# Patient Record
Sex: FEMALE | Race: WHITE | NOT HISPANIC OR LATINO | URBAN - METROPOLITAN AREA
[De-identification: names, ages, dates, MRNs, and addresses within clinical notes are randomized per-mention and may not be internally consistent; named-entity substitution may affect disease eponyms.]

---

## 2020-07-22 ENCOUNTER — HISTORICAL (OUTPATIENT)
Dept: URGENT CARE | Facility: CLINIC | Age: 65
End: 2020-07-22

## 2020-07-22 LAB
BILIRUB SERPL-MCNC: NEGATIVE MG/DL
BLOOD URINE, POC: NEGATIVE
CLARITY, POC UA: NORMAL
COLOR, POC UA: NORMAL
GLUCOSE UR QL STRIP: NEGATIVE
KETONES UR QL STRIP: NEGATIVE
LEUKOCYTE EST, POC UA: NEGATIVE
NITRITE, POC UA: NEGATIVE
PH, POC UA: 5
PROTEIN, POC: NORMAL
SPECIFIC GRAVITY, POC UA: 1.02
UROBILINOGEN, POC UA: NORMAL

## 2020-07-24 LAB — FINAL CULTURE: NO GROWTH

## 2022-09-17 ENCOUNTER — HISTORICAL (OUTPATIENT)
Dept: ADMINISTRATIVE | Facility: HOSPITAL | Age: 67
End: 2022-09-17

## 2023-06-18 ENCOUNTER — OFFICE VISIT (OUTPATIENT)
Dept: URGENT CARE | Facility: CLINIC | Age: 68
End: 2023-06-18
Payer: MEDICARE

## 2023-06-18 VITALS
HEART RATE: 61 BPM | HEIGHT: 65 IN | DIASTOLIC BLOOD PRESSURE: 74 MMHG | OXYGEN SATURATION: 97 % | SYSTOLIC BLOOD PRESSURE: 156 MMHG | WEIGHT: 155 LBS | TEMPERATURE: 98 F | BODY MASS INDEX: 25.83 KG/M2 | RESPIRATION RATE: 18 BRPM

## 2023-06-18 DIAGNOSIS — J32.9 SINUSITIS, UNSPECIFIED CHRONICITY, UNSPECIFIED LOCATION: ICD-10-CM

## 2023-06-18 DIAGNOSIS — R05.9 COUGH, UNSPECIFIED TYPE: Primary | ICD-10-CM

## 2023-06-18 PROCEDURE — 96372 THER/PROPH/DIAG INJ SC/IM: CPT | Mod: ,,, | Performed by: FAMILY MEDICINE

## 2023-06-18 PROCEDURE — 99203 OFFICE O/P NEW LOW 30 MIN: CPT | Mod: 25,,, | Performed by: FAMILY MEDICINE

## 2023-06-18 PROCEDURE — 96372 PR INJECTION,THERAP/PROPH/DIAG2ST, IM OR SUBCUT: ICD-10-PCS | Mod: ,,, | Performed by: FAMILY MEDICINE

## 2023-06-18 PROCEDURE — 99203 PR OFFICE/OUTPT VISIT, NEW, LEVL III, 30-44 MIN: ICD-10-PCS | Mod: 25,,, | Performed by: FAMILY MEDICINE

## 2023-06-18 RX ORDER — LEVOTHYROXINE SODIUM 100 UG/1
100 TABLET ORAL
COMMUNITY

## 2023-06-18 RX ORDER — PROMETHAZINE HYDROCHLORIDE AND DEXTROMETHORPHAN HYDROBROMIDE 6.25; 15 MG/5ML; MG/5ML
5 SYRUP ORAL EVERY 6 HOURS PRN
Qty: 120 ML | Refills: 0 | Status: SHIPPED | OUTPATIENT
Start: 2023-06-18 | End: 2023-06-24

## 2023-06-18 RX ORDER — DEXAMETHASONE SODIUM PHOSPHATE 100 MG/10ML
8 INJECTION INTRAMUSCULAR; INTRAVENOUS
Status: COMPLETED | OUTPATIENT
Start: 2023-06-18 | End: 2023-06-18

## 2023-06-18 RX ORDER — METOPROLOL SUCCINATE 25 MG/1
25 TABLET, EXTENDED RELEASE ORAL
COMMUNITY
Start: 2023-05-02

## 2023-06-18 RX ORDER — SIMVASTATIN 20 MG/1
20 TABLET, FILM COATED ORAL NIGHTLY
COMMUNITY
Start: 2023-03-21

## 2023-06-18 RX ORDER — PREDNISONE 10 MG/1
30 TABLET ORAL DAILY
Qty: 9 TABLET | Refills: 0 | Status: SHIPPED | OUTPATIENT
Start: 2023-06-18 | End: 2023-06-21

## 2023-06-18 RX ORDER — DOXYCYCLINE 100 MG/1
100 CAPSULE ORAL 2 TIMES DAILY
Qty: 14 CAPSULE | Refills: 0 | Status: SHIPPED | OUTPATIENT
Start: 2023-06-18 | End: 2023-06-25

## 2023-06-18 RX ADMIN — DEXAMETHASONE SODIUM PHOSPHATE 8 MG: 100 INJECTION INTRAMUSCULAR; INTRAVENOUS at 08:06

## 2023-06-18 NOTE — PATIENT INSTRUCTIONS
Medications sent to pharmacy  Start antibiotics today  Start oral steroids tomorrow  Cough syrup may cause drowsiness.  Do not drink alcohol or drive when taking it  No smoking while sick  Start taking an allergy pill daily such as claritin, zyrtec, allegrea or xyzal. Also start using a nasal steroid spray such as flonase or nasacort daily. If you are not being treated for high blood pressure, you can also take decongestant such as sudafed as needed. They can be purchased over the counter. If oral steroids were prescribed, start them tomorrow morning. Monitor for fever. Take tylenol/acetaminophen or ibuprofen as needed. Rest and hydrate. If symptoms persist or worsen, return to clinic or seek medical attention immediately.

## 2023-06-18 NOTE — PROGRESS NOTES
"Subjective:      Patient ID: Antonieta Shi is a 68 y.o. female.    Vitals:  height is 5' 5" (1.651 m) and weight is 70.3 kg (155 lb). Her temperature is 98.3 °F (36.8 °C). Her blood pressure is 156/74 (abnormal) and her pulse is 61. Her respiration is 18 and oxygen saturation is 97%.     Chief Complaint: Sore Throat (ST, cough worse in morning x3d Mucinex, and ibuprofen mild /Denies fever)     Patient is a 68 y.o. female who presents to urgent care with complaints of ST, cough worse in morning x3 days.  Patient denies any fever shortness of breath or wheezing.  Patient is a smoker.  Denies any history of asthma or COPD.  States cough also worsens at bedtime.  Denies any significant sinus pressure or congestion but does report runny nose.  Alleviating factors include Mucinex, and ibuprofen with mild amount of relief. Patient denies fever.      Sore Throat   Associated symptoms include coughing.   Constitution: Negative.   HENT:  Positive for sore throat.    Cardiovascular: Negative.    Eyes: Negative.    Respiratory:  Positive for cough.    Gastrointestinal: Negative.    Genitourinary: Negative.    Musculoskeletal: Negative.    Skin: Negative.    Allergic/Immunologic: Negative.    Neurological: Negative.    Hematologic/Lymphatic: Negative.     Objective:     Physical Exam   Constitutional: She is oriented to person, place, and time. She appears well-developed. She is cooperative.  Non-toxic appearance. She does not appear ill. No distress.   HENT:   Head: Normocephalic and atraumatic.   Ears:   Right Ear: Hearing and external ear normal.   Left Ear: Hearing and external ear normal.   Mouth/Throat: Mucous membranes are normal. Posterior oropharyngeal erythema (thick postnasal drip) present.   Eyes: Conjunctivae and lids are normal.   Neck: Trachea normal and phonation normal. Neck supple. No edema present. No erythema present. No neck rigidity present.   Cardiovascular: Normal rate.   Pulmonary/Chest: Effort " "normal and breath sounds normal. No stridor. No respiratory distress. She has no decreased breath sounds. She has no wheezes. She has no rhonchi. She has no rales.   Abdominal: Normal appearance.   Lymphadenopathy:     She has no cervical adenopathy.   Neurological: She is alert and oriented to person, place, and time. She exhibits normal muscle tone. Coordination normal.   Skin: Skin is warm, dry, intact, not diaphoretic and no rash.   Psychiatric: Her speech is normal and behavior is normal. Mood, judgment and thought content normal.   Nursing note and vitals reviewed.       Previous History      Review of patient's allergies indicates:  No Known Allergies    Past Medical History:   Diagnosis Date    Hypertension     Hyponatremia     Thyroid disease      Current Outpatient Medications   Medication Instructions    doxycycline (MONODOX) 100 mg, Oral, 2 times daily    levothyroxine (SYNTHROID) 100 mcg, Oral    metoprolol succinate (TOPROL-XL) 25 mg, Oral    predniSONE (DELTASONE) 30 mg, Oral, Daily    promethazine-dextromethorphan (PROMETHAZINE-DM) 6.25-15 mg/5 mL Syrp 5 mLs, Oral, Every 6 hours PRN    simvastatin (ZOCOR) 20 mg, Oral, Nightly     Past Surgical History:   Procedure Laterality Date    GUM SURGERY      HEMORRHOID SURGERY      TONSILLECTOMY       History reviewed. No pertinent family history.    Social History     Tobacco Use    Smoking status: Every Day     Types: Cigarettes    Smokeless tobacco: Never   Substance Use Topics    Alcohol use: Yes     Comment: occassionally        Physical Exam      Vital Signs Reviewed   BP (!) 156/74   Pulse 61   Temp 98.3 °F (36.8 °C)   Resp 18   Ht 5' 5" (1.651 m)   Wt 70.3 kg (155 lb)   SpO2 97%   BMI 25.79 kg/m²        Procedures    Procedures     Labs     Results for orders placed or performed in visit on 09/17/22   POCT Urinalysis   Result Value Ref Range    Color, UA Dark yellow     Clarity, UA Slightly cloudy     Glucose, UA Negative     Bilirubin, POC " Negative     Ketones, UA Negative     Spec Grav UA 1.020     Blood, UA Negative     pH, UA 5     Protein, POC 1+ (30 mg/dl)     Urobilinogen, UA 2 mg/dl     Nitrite, UA Negative     Leukocytes, UA Negative        Assessment:     1. Cough, unspecified type    2. Sinusitis, unspecified chronicity, unspecified location        Plan:   Medications sent to pharmacy  Start antibiotics today  Start oral steroids tomorrow  Cough syrup may cause drowsiness.  Do not drink alcohol or drive when taking it  No smoking while sick  Start taking an allergy pill daily such as claritin, zyrtec, allegrea or xyzal. Also start using a nasal steroid spray such as flonase or nasacort daily. If you are not being treated for high blood pressure, you can also take decongestant such as sudafed as needed. They can be purchased over the counter. If oral steroids were prescribed, start them tomorrow morning. Monitor for fever. Take tylenol/acetaminophen or ibuprofen as needed. Rest and hydrate. If symptoms persist or worsen, return to clinic or seek medical attention immediately.       Cough, unspecified type    Sinusitis, unspecified chronicity, unspecified location    Other orders  -     dexAMETHasone injection 8 mg  -     predniSONE (DELTASONE) 10 MG tablet; Take 3 tablets (30 mg total) by mouth once daily. for 3 days  Dispense: 9 tablet; Refill: 0  -     doxycycline (MONODOX) 100 MG capsule; Take 1 capsule (100 mg total) by mouth 2 (two) times daily. for 7 days  Dispense: 14 capsule; Refill: 0  -     promethazine-dextromethorphan (PROMETHAZINE-DM) 6.25-15 mg/5 mL Syrp; Take 5 mLs by mouth every 6 (six) hours as needed (cough).  Dispense: 120 mL; Refill: 0

## 2023-12-04 ENCOUNTER — OFFICE VISIT (OUTPATIENT)
Dept: URGENT CARE | Facility: CLINIC | Age: 68
End: 2023-12-04
Payer: MEDICARE

## 2023-12-04 VITALS
HEART RATE: 64 BPM | HEIGHT: 65 IN | SYSTOLIC BLOOD PRESSURE: 155 MMHG | BODY MASS INDEX: 25.83 KG/M2 | WEIGHT: 155 LBS | TEMPERATURE: 98 F | RESPIRATION RATE: 18 BRPM | DIASTOLIC BLOOD PRESSURE: 69 MMHG | OXYGEN SATURATION: 97 %

## 2023-12-04 DIAGNOSIS — R09.81 NASAL CONGESTION: ICD-10-CM

## 2023-12-04 DIAGNOSIS — R05.1 ACUTE COUGH: ICD-10-CM

## 2023-12-04 DIAGNOSIS — J01.41 ACUTE RECURRENT PANSINUSITIS: Primary | ICD-10-CM

## 2023-12-04 PROCEDURE — 96372 PR INJECTION,THERAP/PROPH/DIAG2ST, IM OR SUBCUT: ICD-10-PCS | Mod: ,,, | Performed by: NURSE PRACTITIONER

## 2023-12-04 PROCEDURE — 96372 THER/PROPH/DIAG INJ SC/IM: CPT | Mod: ,,, | Performed by: NURSE PRACTITIONER

## 2023-12-04 PROCEDURE — 99213 OFFICE O/P EST LOW 20 MIN: CPT | Mod: 25,,, | Performed by: NURSE PRACTITIONER

## 2023-12-04 PROCEDURE — 99213 PR OFFICE/OUTPT VISIT, EST, LEVL III, 20-29 MIN: ICD-10-PCS | Mod: 25,,, | Performed by: NURSE PRACTITIONER

## 2023-12-04 RX ORDER — DEXAMETHASONE SODIUM PHOSPHATE 100 MG/10ML
8 INJECTION INTRAMUSCULAR; INTRAVENOUS
Status: COMPLETED | OUTPATIENT
Start: 2023-12-04 | End: 2023-12-04

## 2023-12-04 RX ORDER — PROMETHAZINE HYDROCHLORIDE AND DEXTROMETHORPHAN HYDROBROMIDE 6.25; 15 MG/5ML; MG/5ML
5 SYRUP ORAL
Qty: 120 ML | Refills: 0 | Status: SHIPPED | OUTPATIENT
Start: 2023-12-04

## 2023-12-04 RX ORDER — AZITHROMYCIN 250 MG/1
TABLET, FILM COATED ORAL
Qty: 6 EACH | Refills: 0 | Status: SHIPPED | OUTPATIENT
Start: 2023-12-04 | End: 2023-12-09

## 2023-12-04 RX ADMIN — DEXAMETHASONE SODIUM PHOSPHATE 8 MG: 100 INJECTION INTRAMUSCULAR; INTRAVENOUS at 04:12

## 2023-12-04 NOTE — PROGRESS NOTES
"     Previous History      Review of patient's allergies indicates:  No Known Allergies    Past Medical History:   Diagnosis Date    Hypertension     Hyponatremia     Thyroid disease      Current Outpatient Medications   Medication Instructions    levothyroxine (SYNTHROID) 100 mcg, Oral    metoprolol succinate (TOPROL-XL) 25 mg, Oral    simvastatin (ZOCOR) 20 mg, Oral, Nightly     Past Surgical History:   Procedure Laterality Date    GUM SURGERY      HEMORRHOID SURGERY      TONSILLECTOMY       Family History   Family history unknown: Yes       Social History     Tobacco Use    Smoking status: Every Day     Types: Cigarettes    Smokeless tobacco: Never   Substance Use Topics    Alcohol use: Yes     Comment: occassionally        Physical Exam      Vital Signs Reviewed   BP (!) 155/69   Pulse 64   Temp 98.3 °F (36.8 °C)   Resp 18   Ht 5' 5" (1.651 m)   Wt 70.3 kg (155 lb)   SpO2 97%   BMI 25.79 kg/m²        Procedures    Procedures     Labs     Results for orders placed or performed in visit on 09/17/22   POCT Urinalysis   Result Value Ref Range    Color, UA Dark yellow     Clarity, UA Slightly cloudy     Glucose, UA Negative     Bilirubin, POC Negative     Ketones, UA Negative     Spec Grav UA 1.020     Blood, UA Negative     pH, UA 5     Protein, POC 1+ (30 mg/dl)     Urobilinogen, UA 2 mg/dl     Nitrite, UA Negative     Leukocytes, UA Negative    Subjective:      Patient ID: Antonieta Shi is a 68 y.o. female.    Vitals:  height is 5' 5" (1.651 m) and weight is 70.3 kg (155 lb). Her temperature is 98.3 °F (36.8 °C). Her blood pressure is 155/69 (abnormal) and her pulse is 64. Her respiration is 18 and oxygen saturation is 97%.     Chief Complaint: Cough     See 8-year-old white female presents to clinic complaining of nonproductive cough, sinus pain, ear pressure, and nasal congestion x3 days.  Patient states symptoms are progressively getting worse even though she is been taking OTC " medications.  Patient states she has a history of reoccurring sinusitis.  Patient requesting steroid shot and antibiotics.    Cough  Associated symptoms include a sore throat.     Constitution: Negative.   HENT:  Positive for sinus pain, sinus pressure and sore throat.    Neck: Positive for painful lymph nodes.   Cardiovascular: Negative.    Eyes: Negative.    Respiratory:  Positive for cough.    Gastrointestinal: Negative.    Endocrine: negative.   Genitourinary: Negative.    Musculoskeletal: Negative.    Skin: Negative.    Allergic/Immunologic: Negative.    Neurological: Negative.    Hematologic/Lymphatic: Positive for swollen lymph nodes.   Psychiatric/Behavioral: Negative.        Objective:     Physical Exam   Constitutional: She is oriented to person, place, and time. She appears well-developed. She is cooperative. She appears ill.   HENT:   Head: Normocephalic and atraumatic.   Ears:   Right Ear: Hearing, tympanic membrane, external ear and ear canal normal.   Left Ear: Hearing, tympanic membrane, external ear and ear canal normal.   Nose: Congestion present. No mucosal edema or nasal deformity. No epistaxis. Right sinus exhibits no maxillary sinus tenderness and no frontal sinus tenderness. Left sinus exhibits no maxillary sinus tenderness and no frontal sinus tenderness.   Mouth/Throat: Uvula is midline, oropharynx is clear and moist and mucous membranes are normal. Mucous membranes are moist. No trismus in the jaw. Normal dentition. No uvula swelling.   Eyes: Conjunctivae and lids are normal.   Neck: Trachea normal and phonation normal. Neck supple.   Cardiovascular: Normal rate, regular rhythm, normal heart sounds and normal pulses.   Pulmonary/Chest: Effort normal and breath sounds normal.   Abdominal: Normal appearance and bowel sounds are normal. Soft.   Musculoskeletal: Normal range of motion.         General: Normal range of motion.   Lymphadenopathy:     She has cervical adenopathy.        Right  cervical: Superficial cervical adenopathy present.        Left cervical: Superficial cervical adenopathy present.   Neurological: She is alert and oriented to person, place, and time. She exhibits normal muscle tone.   Skin: Skin is warm, dry and intact.   Psychiatric: Her speech is normal and behavior is normal. Judgment and thought content normal.   Nursing note and vitals reviewed.      Assessment:     1. Acute recurrent pansinusitis    2. Nasal congestion        Plan:   Drink plenty of fluids    Get plenty of rest.    Follow-up with your Primary Care Provider as needed.      Present to the Emergency Department with any significant change or worsening symptoms.     Acute recurrent pansinusitis  -     dexAMETHasone injection 8 mg    Nasal congestion

## 2024-09-29 ENCOUNTER — OFFICE VISIT (OUTPATIENT)
Dept: URGENT CARE | Facility: CLINIC | Age: 69
End: 2024-09-29
Payer: MEDICARE

## 2024-09-29 VITALS
TEMPERATURE: 97 F | WEIGHT: 155 LBS | HEIGHT: 65 IN | BODY MASS INDEX: 25.83 KG/M2 | SYSTOLIC BLOOD PRESSURE: 196 MMHG | HEART RATE: 76 BPM | RESPIRATION RATE: 20 BRPM | DIASTOLIC BLOOD PRESSURE: 93 MMHG | OXYGEN SATURATION: 100 %

## 2024-09-29 DIAGNOSIS — F17.200 HEAVY SMOKER: ICD-10-CM

## 2024-09-29 DIAGNOSIS — R42 DIZZINESS: Primary | ICD-10-CM

## 2024-09-29 DIAGNOSIS — R03.0 ELEVATED BLOOD PRESSURE READING: ICD-10-CM

## 2024-09-29 DIAGNOSIS — Z75.8 DOES NOT HAVE PRIMARY CARE PROVIDER: ICD-10-CM

## 2024-09-29 PROCEDURE — 99213 OFFICE O/P EST LOW 20 MIN: CPT | Mod: ,,,

## 2024-09-29 NOTE — PROGRESS NOTES
"Subjective:      Patient ID: Antonieta Shi is a 69 y.o. female.    Vitals:  height is 5' 5" (1.651 m) and weight is 70.3 kg (155 lb). Her tympanic temperature is 96.9 °F (36.1 °C). Her blood pressure is 196/93 (abnormal) and her pulse is 76. Her respiration is 20 and oxygen saturation is 100%.     Chief Complaint: Dizziness    Antonieta Shi is a 69-year-old woman who arrives at urgent care reporting intermittent dizziness that has persisted for the past 3 to 4 days. She has a history of vertigo and was previously hospitalized for low sodium levels. The patient notes that increasing her electrolyte intake provides some mild relief. She is an active smoker and does not have a primary care physician.    Dizziness: no palpitations and no chest pain.      Constitution: Negative.   HENT: Negative.     Neck: neck negative.   Cardiovascular:  Negative for chest pain and palpitations.   Eyes: Negative.    Respiratory: Negative.     Gastrointestinal: Negative.    Endocrine: negative.   Genitourinary: Negative.    Musculoskeletal: Negative.    Skin: Negative.    Allergic/Immunologic: Negative.    Neurological:  Positive for dizziness and history of vertigo. Negative for speech difficulty and disorientation.   Hematologic/Lymphatic: Negative.    Psychiatric/Behavioral:  Negative for disorientation.       Objective:     Physical Exam   Constitutional: She is oriented to person, place, and time. She appears well-developed. She is cooperative.   HENT:   Head: Normocephalic and atraumatic.   Ears:   Right Ear: Hearing, tympanic membrane, external ear and ear canal normal.   Left Ear: Hearing, tympanic membrane, external ear and ear canal normal.   Nose: Nose normal. No mucosal edema or nasal deformity. No epistaxis. Right sinus exhibits no maxillary sinus tenderness and no frontal sinus tenderness. Left sinus exhibits no maxillary sinus tenderness and no frontal sinus tenderness.   Mouth/Throat: Uvula is midline, oropharynx " is clear and moist and mucous membranes are normal. No trismus in the jaw. Normal dentition. No uvula swelling.   Eyes: Conjunctivae and lids are normal.   Neck: Trachea normal and phonation normal. Neck supple.   Cardiovascular: Normal rate, regular rhythm, normal heart sounds and normal pulses.   Pulmonary/Chest: Effort normal and breath sounds normal.   Abdominal: Normal appearance and bowel sounds are normal. Soft.   Musculoskeletal: Normal range of motion.         General: Normal range of motion.   Neurological: She is alert and oriented to person, place, and time. She exhibits normal muscle tone.   Skin: Skin is warm, dry and intact.   Psychiatric: Her speech is normal and behavior is normal. Judgment and thought content normal.   Nursing note and vitals reviewed.    Assessment:     1. Dizziness    2. Elevated blood pressure reading    3. Does not have primary care provider    4. Heavy smoker      Plan:     Dizziness  -     Refer to Emergency Dept.    Elevated blood pressure reading  -     Refer to Emergency Dept.    Does not have primary care provider  -     Ambulatory referral/consult to Internal Medicine; Future; Expected date: 10/06/2024    Heavy smoker    Given the patient's age, her previous episodes of vertigo, and her history of hyponatremia, along with the elevated blood pressure readings observed during today's examination raise additional red flags that warrant immediate attention. Therefore, I recommended that she undergo further evaluation in the emergency room to rule out any acute complications or underlying conditions that may be contributing to her symptoms. I emphasized the importance of establishing care with a primary care physician. Given her medical history, having a consistent healthcare provider will allow for better management of her chronic conditions, regular monitoring of her health status, and timely interventions when necessary.

## 2024-11-19 PROBLEM — E03.9 HYPOTHYROIDISM: Status: ACTIVE | Noted: 2024-11-19

## 2024-11-19 PROBLEM — I10 HYPERTENSION: Status: ACTIVE | Noted: 2022-09-04

## 2024-11-19 PROBLEM — Z72.0 TOBACCO USER: Status: ACTIVE | Noted: 2024-11-19

## 2024-11-19 RX ORDER — TRIAMTERENE AND HYDROCHLOROTHIAZIDE 37.5; 25 MG/1; MG/1
1 CAPSULE ORAL DAILY
COMMUNITY
End: 2024-11-22

## 2024-11-22 ENCOUNTER — OFFICE VISIT (OUTPATIENT)
Dept: PRIMARY CARE CLINIC | Facility: CLINIC | Age: 69
End: 2024-11-22
Payer: MEDICARE

## 2024-11-22 VITALS
DIASTOLIC BLOOD PRESSURE: 95 MMHG | WEIGHT: 152 LBS | HEIGHT: 65 IN | OXYGEN SATURATION: 96 % | SYSTOLIC BLOOD PRESSURE: 195 MMHG | RESPIRATION RATE: 20 BRPM | HEART RATE: 84 BPM | TEMPERATURE: 98 F | BODY MASS INDEX: 25.33 KG/M2

## 2024-11-22 DIAGNOSIS — I10 PRIMARY HYPERTENSION: Chronic | ICD-10-CM

## 2024-11-22 DIAGNOSIS — Z78.0 POST-MENOPAUSAL: ICD-10-CM

## 2024-11-22 DIAGNOSIS — Z12.2 ENCOUNTER FOR SCREENING FOR LUNG CANCER: ICD-10-CM

## 2024-11-22 DIAGNOSIS — R73.09 ELEVATED GLUCOSE LEVEL: ICD-10-CM

## 2024-11-22 DIAGNOSIS — E03.9 HYPOTHYROIDISM, UNSPECIFIED TYPE: Chronic | ICD-10-CM

## 2024-11-22 DIAGNOSIS — Z00.00 WELLNESS EXAMINATION: ICD-10-CM

## 2024-11-22 DIAGNOSIS — F17.210 CIGARETTE NICOTINE DEPENDENCE WITHOUT COMPLICATION: ICD-10-CM

## 2024-11-22 DIAGNOSIS — Z76.89 ENCOUNTER TO ESTABLISH CARE WITH NEW DOCTOR: Primary | ICD-10-CM

## 2024-11-22 DIAGNOSIS — Z12.31 ENCOUNTER FOR SCREENING MAMMOGRAM FOR BREAST CANCER: ICD-10-CM

## 2024-11-22 RX ORDER — LEVOTHYROXINE SODIUM 88 UG/1
88 TABLET ORAL
COMMUNITY
Start: 2024-10-03

## 2024-11-22 RX ORDER — METOPROLOL SUCCINATE 50 MG/1
50 TABLET, EXTENDED RELEASE ORAL DAILY
Qty: 90 TABLET | Refills: 3 | Status: SHIPPED | OUTPATIENT
Start: 2024-11-22 | End: 2025-11-22

## 2024-11-22 RX ORDER — AMLODIPINE BESYLATE 5 MG/1
5 TABLET ORAL DAILY
Qty: 90 TABLET | Refills: 3 | Status: SHIPPED | OUTPATIENT
Start: 2024-11-22 | End: 2025-11-22

## 2024-11-22 NOTE — ASSESSMENT & PLAN NOTE
Currently smoking 1 pack per day  No interest in quitting at this time   CT lung cancer screening placed  Encouraged patient to stop smoking for overall  health benefits, voiced understanding and states she will let us know when she is ready      39

## 2024-11-22 NOTE — ASSESSMENT & PLAN NOTE
Stable, continue Levothyroxine   Last TSH WNL  Defer to endocrinology   Encouraged strict medication compliance (take in the AM on an empty stomach with a full glass of water, no food/drink or other medications for >30 minutes)

## 2024-11-22 NOTE — LETTER
AUTHORIZATION FOR RELEASE OF   CONFIDENTIAL INFORMATION    Dear Medical Records,    We are seeing Antonieta Shi, date of birth 1955, in the clinic at [facility]. Ambika Rees MD is the patient's PCP. Antonieta Shi has an outstanding lab/procedure at the time we reviewed her chart. In order to help keep her health information updated, she has authorized us to request the following medical record(s):        (  )  MAMMOGRAM                                      (  )  COLONOSCOPY      (  )  PAP SMEAR                                          (  )  OUTSIDE LAB RESULTS     (  )  DEXA SCAN                                          (  )  EYE EXAM            (  )  FOOT EXAM                                          ( x )  ENTIRE RECORD     (  )  OUTSIDE IMMUNIZATIONS                 (  )  _______________         Please fax records to [facility], [place], Madelaine F, MD, [phone]               Patient Name: Antonieta Shi  : 1955  Patient Phone #: [phone]

## 2024-11-22 NOTE — PROGRESS NOTES
Subjective:       Patient ID: Antonieta Shi is a 69 y.o. female.    -------------------------------------    Hypertension    Hyponatremia    Thyroid disease      Chief Complaint: Establish Care    Presents to the clinic to establish care. History of significantly elevated BP. Currently on metoprolol 25 mg daily. Was seen in the ED in September for lightheadedness/weakness with elevated blood pressure. Elevated at today's visit. Doesn't check BP at home. Still has dizziness at times. Sees endocrinologist for hypothyroidism. Currently smokes 1ppd, for years. Interested in lung cancer screening. Due for DEXA and mammogram.       Review of Systems   Constitutional:  Negative for chills and fever.   Respiratory:  Negative for cough.    Cardiovascular:  Negative for chest pain.   Gastrointestinal:  Negative for abdominal pain, nausea and vomiting.   Genitourinary:  Negative for dysuria and hematuria.   Neurological:  Positive for dizziness.         Objective:      Physical Exam  Vitals and nursing note reviewed.   Constitutional:       General: She is not in acute distress.     Appearance: Normal appearance. She is not ill-appearing.   HENT:      Head: Normocephalic.      Nose: Nose normal. No rhinorrhea.      Mouth/Throat:      Mouth: Mucous membranes are moist.   Eyes:      General: No scleral icterus.     Conjunctiva/sclera: Conjunctivae normal.   Cardiovascular:      Rate and Rhythm: Normal rate and regular rhythm.   Pulmonary:      Effort: Pulmonary effort is normal. No respiratory distress.   Musculoskeletal:         General: No deformity.   Skin:     General: Skin is warm and dry.      Coloration: Skin is not jaundiced.   Neurological:      Mental Status: She is alert and oriented to person, place, and time. Mental status is at baseline.      Gait: Gait normal.   Psychiatric:         Mood and Affect: Mood normal.         Behavior: Behavior normal.         Assessment & Plan:   1. Encounter to establish care with  new doctor  Comments:  Reviewed medical history, reconciled medications   Ordered wellness labs   Requested records from previous provider/specialists  Orders:  -     CBC Auto Differential; Future; Expected date: 01/05/2025  -     Comprehensive Metabolic Panel; Future; Expected date: 01/05/2025  -     Urinalysis; Future; Expected date: 01/05/2025  -     TSH; Future; Expected date: 01/05/2025  -     Lipid Panel; Future; Expected date: 01/05/2025  -     Hemoglobin A1C; Future; Expected date: 01/05/2025    2. Primary hypertension  Assessment & Plan:  Today's BP significantly elevated, appears to be chronically like this reviewing chart  Continue metoprolol, increase to 50 mg daily, if BP is still elevated after 5 days, okay to increase to 100 mg daily as long as the heart rate above 60  Start Amlodipine   Counseled on low sodium diet, exercise, tobacco avoidance, and limiting alcohol intake   Pt. Has home BP cuff, instructed to measure home BP and report abnormal values   Nurse Visit in 2 weeks for BP check, bring logs       Orders:  -     amLODIPine (NORVASC) 5 MG tablet; Take 1 tablet (5 mg total) by mouth once daily.  Dispense: 90 tablet; Refill: 3  -     metoprolol succinate (TOPROL-XL) 50 MG 24 hr tablet; Take 1 tablet (50 mg total) by mouth once daily.  Dispense: 90 tablet; Refill: 3  -     CBC Auto Differential; Future; Expected date: 01/05/2025  -     Comprehensive Metabolic Panel; Future; Expected date: 01/05/2025  -     Urinalysis; Future; Expected date: 01/05/2025  -     TSH; Future; Expected date: 01/05/2025  -     Lipid Panel; Future; Expected date: 01/05/2025  -     Hemoglobin A1C; Future; Expected date: 01/05/2025    3. Hypothyroidism, unspecified type  Assessment & Plan:  Stable, continue Levothyroxine   Last TSH WNL  Defer to endocrinology   Encouraged strict medication compliance (take in the AM on an empty stomach with a full glass of water, no food/drink or other medications for >30 minutes)      Orders:  -     TSH; Future; Expected date: 01/05/2025    4. Post-menopausal  -     DXA Bone Density Axial Skeleton 1 or more sites; Future; Expected date: 11/22/2024    5. Encounter for screening mammogram for breast cancer  -     Mammo Digital Screening Bilat w/ Lexx; Future; Expected date: 11/22/2024    6. Cigarette nicotine dependence without complication  Assessment & Plan:  Currently smoking 1 pack per day  No interest in quitting at this time   CT lung cancer screening placed  Encouraged patient to stop smoking for overall  health benefits, voiced understanding and states she will let us know when she is ready       Orders:  -     CT Chest Lung Screening Low Dose; Future; Expected date: 11/22/2024    7. Encounter for screening for lung cancer [Z12.2]  -     CT Chest Lung Screening Low Dose; Future; Expected date: 11/22/2024    8. Elevated glucose level  -     Hemoglobin A1C; Future; Expected date: 01/05/2025    9. Wellness examination  -     CBC Auto Differential; Future; Expected date: 01/05/2025  -     Comprehensive Metabolic Panel; Future; Expected date: 01/05/2025  -     Urinalysis; Future; Expected date: 01/05/2025  -     TSH; Future; Expected date: 01/05/2025  -     Lipid Panel; Future; Expected date: 01/05/2025  -     Hemoglobin A1C; Future; Expected date: 01/05/2025    Follow up in about 6 weeks (around 1/3/2025). In addition to their scheduled follow up, the patient has also been instructed to follow up on as needed basis.

## 2024-11-22 NOTE — ASSESSMENT & PLAN NOTE
Today's BP significantly elevated, appears to be chronically like this reviewing chart  Continue metoprolol, increase to 50 mg daily, if BP is still elevated after 5 days, okay to increase to 100 mg daily as long as the heart rate above 60  Start Amlodipine   Counseled on low sodium diet, exercise, tobacco avoidance, and limiting alcohol intake   Pt. Has home BP cuff, instructed to measure home BP and report abnormal values   Nurse Visit in 2 weeks for BP check, bring logs

## 2024-12-06 ENCOUNTER — CLINICAL SUPPORT (OUTPATIENT)
Dept: PRIMARY CARE CLINIC | Facility: CLINIC | Age: 69
End: 2024-12-06
Payer: MEDICARE

## 2024-12-06 ENCOUNTER — TELEPHONE (OUTPATIENT)
Dept: PRIMARY CARE CLINIC | Facility: CLINIC | Age: 69
End: 2024-12-06

## 2024-12-06 VITALS — DIASTOLIC BLOOD PRESSURE: 78 MMHG | SYSTOLIC BLOOD PRESSURE: 158 MMHG

## 2024-12-06 DIAGNOSIS — I10 PRIMARY HYPERTENSION: Primary | ICD-10-CM

## 2024-12-06 NOTE — PROGRESS NOTES
Pt present today for blood pressure check.   Came with home log.   No other complaints.   Pt reports taking medications as prescribed.   Readings in office today: Office cuff - /78 (L) Pulse 79, /67 (R). Pt cuff - 165/82 (L)     Pt educated on the importance of taking medications as prescribed, taking blood pressures at home, keeping all scheduled appointments.   Pt verbalized understanding.     Pt has a follow up appt in January.

## 2025-01-09 ENCOUNTER — LAB VISIT (OUTPATIENT)
Dept: LAB | Facility: HOSPITAL | Age: 70
End: 2025-01-09
Attending: STUDENT IN AN ORGANIZED HEALTH CARE EDUCATION/TRAINING PROGRAM
Payer: MEDICARE

## 2025-01-09 ENCOUNTER — TELEPHONE (OUTPATIENT)
Dept: PRIMARY CARE CLINIC | Facility: CLINIC | Age: 70
End: 2025-01-09
Payer: MEDICARE

## 2025-01-09 DIAGNOSIS — R73.09 ELEVATED GLUCOSE LEVEL: ICD-10-CM

## 2025-01-09 DIAGNOSIS — Z00.00 WELLNESS EXAMINATION: ICD-10-CM

## 2025-01-09 DIAGNOSIS — I10 PRIMARY HYPERTENSION: ICD-10-CM

## 2025-01-09 DIAGNOSIS — Z76.89 ENCOUNTER TO ESTABLISH CARE WITH NEW DOCTOR: ICD-10-CM

## 2025-01-09 DIAGNOSIS — E03.9 HYPOTHYROIDISM, UNSPECIFIED TYPE: Chronic | ICD-10-CM

## 2025-01-09 LAB
ALBUMIN SERPL-MCNC: 4.1 G/DL (ref 3.4–4.8)
ALBUMIN/GLOB SERPL: 1.5 RATIO (ref 1.1–2)
ALP SERPL-CCNC: 100 UNIT/L (ref 40–150)
ALT SERPL-CCNC: 20 UNIT/L (ref 0–55)
ANION GAP SERPL CALC-SCNC: 5 MEQ/L
AST SERPL-CCNC: 20 UNIT/L (ref 5–34)
BACTERIA #/AREA URNS AUTO: ABNORMAL /HPF
BASOPHILS # BLD AUTO: 0.08 X10(3)/MCL
BASOPHILS NFR BLD AUTO: 1.4 %
BILIRUB SERPL-MCNC: 1.6 MG/DL
BILIRUB UR QL STRIP.AUTO: NEGATIVE
BUN SERPL-MCNC: 19.7 MG/DL (ref 9.8–20.1)
CALCIUM SERPL-MCNC: 9.9 MG/DL (ref 8.4–10.2)
CHLORIDE SERPL-SCNC: 104 MMOL/L (ref 98–107)
CHOLEST SERPL-MCNC: 174 MG/DL
CHOLEST/HDLC SERPL: 3 {RATIO} (ref 0–5)
CLARITY UR: CLEAR
CO2 SERPL-SCNC: 25 MMOL/L (ref 23–31)
COLOR UR AUTO: ABNORMAL
CREAT SERPL-MCNC: 0.98 MG/DL (ref 0.55–1.02)
CREAT/UREA NIT SERPL: 20
EOSINOPHIL # BLD AUTO: 0.27 X10(3)/MCL (ref 0–0.9)
EOSINOPHIL NFR BLD AUTO: 4.7 %
ERYTHROCYTE [DISTWIDTH] IN BLOOD BY AUTOMATED COUNT: 12.5 % (ref 11.5–17)
EST. AVERAGE GLUCOSE BLD GHB EST-MCNC: 102.5 MG/DL
GFR SERPLBLD CREATININE-BSD FMLA CKD-EPI: >60 ML/MIN/1.73/M2
GLOBULIN SER-MCNC: 2.7 GM/DL (ref 2.4–3.5)
GLUCOSE SERPL-MCNC: 92 MG/DL (ref 82–115)
GLUCOSE UR QL STRIP: NORMAL
HBA1C MFR BLD: 5.2 %
HCT VFR BLD AUTO: 46.2 % (ref 37–47)
HDLC SERPL-MCNC: 61 MG/DL (ref 35–60)
HGB BLD-MCNC: 15.1 G/DL (ref 12–16)
HGB UR QL STRIP: NEGATIVE
IMM GRANULOCYTES # BLD AUTO: 0.02 X10(3)/MCL (ref 0–0.04)
IMM GRANULOCYTES NFR BLD AUTO: 0.3 %
KETONES UR QL STRIP: NEGATIVE
LDLC SERPL CALC-MCNC: 98 MG/DL (ref 50–140)
LEUKOCYTE ESTERASE UR QL STRIP: NEGATIVE
LYMPHOCYTES # BLD AUTO: 1.23 X10(3)/MCL (ref 0.6–4.6)
LYMPHOCYTES NFR BLD AUTO: 21.2 %
MCH RBC QN AUTO: 31.3 PG (ref 27–31)
MCHC RBC AUTO-ENTMCNC: 32.7 G/DL (ref 33–36)
MCV RBC AUTO: 95.7 FL (ref 80–94)
MONOCYTES # BLD AUTO: 0.45 X10(3)/MCL (ref 0.1–1.3)
MONOCYTES NFR BLD AUTO: 7.8 %
MUCOUS THREADS URNS QL MICRO: ABNORMAL /LPF
NEUTROPHILS # BLD AUTO: 3.75 X10(3)/MCL (ref 2.1–9.2)
NEUTROPHILS NFR BLD AUTO: 64.6 %
NITRITE UR QL STRIP: NEGATIVE
NRBC BLD AUTO-RTO: 0 %
PH UR STRIP: 6 [PH]
PLATELET # BLD AUTO: 242 X10(3)/MCL (ref 130–400)
PMV BLD AUTO: 11.6 FL (ref 7.4–10.4)
POTASSIUM SERPL-SCNC: 4.6 MMOL/L (ref 3.5–5.1)
PROT SERPL-MCNC: 6.8 GM/DL (ref 5.8–7.6)
PROT UR QL STRIP: ABNORMAL
RBC # BLD AUTO: 4.83 X10(6)/MCL (ref 4.2–5.4)
RBC #/AREA URNS AUTO: ABNORMAL /HPF
SODIUM SERPL-SCNC: 134 MMOL/L (ref 136–145)
SP GR UR STRIP.AUTO: 1.01 (ref 1–1.03)
SQUAMOUS #/AREA URNS LPF: ABNORMAL /HPF
TRIGL SERPL-MCNC: 73 MG/DL (ref 37–140)
TSH SERPL-ACNC: 0.14 UIU/ML (ref 0.35–4.94)
UROBILINOGEN UR STRIP-ACNC: NORMAL
VLDLC SERPL CALC-MCNC: 15 MG/DL
WBC # BLD AUTO: 5.8 X10(3)/MCL (ref 4.5–11.5)
WBC #/AREA URNS AUTO: ABNORMAL /HPF

## 2025-01-09 PROCEDURE — 83036 HEMOGLOBIN GLYCOSYLATED A1C: CPT

## 2025-01-09 PROCEDURE — 81001 URINALYSIS AUTO W/SCOPE: CPT

## 2025-01-09 PROCEDURE — 84443 ASSAY THYROID STIM HORMONE: CPT

## 2025-01-09 PROCEDURE — 80053 COMPREHEN METABOLIC PANEL: CPT

## 2025-01-09 PROCEDURE — 85025 COMPLETE CBC W/AUTO DIFF WBC: CPT

## 2025-01-09 PROCEDURE — 80061 LIPID PANEL: CPT

## 2025-01-09 PROCEDURE — 36415 COLL VENOUS BLD VENIPUNCTURE: CPT

## 2025-01-09 NOTE — TELEPHONE ENCOUNTER
----- Message from Nesha sent at 1/9/2025  9:41 AM CST -----  Regarding: Pt called to speak to the nurse regarding a blood work order she received today and pt would like a call back this morning asap  Type:  Medical Advice    Who Called: Pt called to speak to the nurse regarding a blood work order she received today and pt would like a call back this morning asap. Pt states that she already had labs done    Best Call Back Number: [phone]

## 2025-01-10 ENCOUNTER — OFFICE VISIT (OUTPATIENT)
Dept: PRIMARY CARE CLINIC | Facility: CLINIC | Age: 70
End: 2025-01-10
Payer: MEDICARE

## 2025-01-10 VITALS
SYSTOLIC BLOOD PRESSURE: 122 MMHG | RESPIRATION RATE: 15 BRPM | DIASTOLIC BLOOD PRESSURE: 80 MMHG | TEMPERATURE: 98 F | HEIGHT: 65 IN | BODY MASS INDEX: 25.52 KG/M2 | OXYGEN SATURATION: 98 % | WEIGHT: 153.19 LBS | HEART RATE: 55 BPM

## 2025-01-10 DIAGNOSIS — J98.4 CALCIFIED GRANULOMA OF LUNG: Chronic | ICD-10-CM

## 2025-01-10 DIAGNOSIS — I70.0 ATHEROSCLEROSIS OF ABDOMINAL AORTA: Chronic | ICD-10-CM

## 2025-01-10 DIAGNOSIS — Z00.00 WELLNESS EXAMINATION: Primary | Chronic | ICD-10-CM

## 2025-01-10 DIAGNOSIS — J43.9 PULMONARY EMPHYSEMA, UNSPECIFIED EMPHYSEMA TYPE: Chronic | ICD-10-CM

## 2025-01-10 DIAGNOSIS — Z12.11 COLON CANCER SCREENING: ICD-10-CM

## 2025-01-10 DIAGNOSIS — H61.23 BILATERAL IMPACTED CERUMEN: ICD-10-CM

## 2025-01-10 DIAGNOSIS — E87.1 HYPONATREMIA: ICD-10-CM

## 2025-01-10 DIAGNOSIS — I10 PRIMARY HYPERTENSION: Chronic | ICD-10-CM

## 2025-01-10 RX ORDER — VALSARTAN 160 MG/1
160 TABLET ORAL DAILY
Qty: 90 TABLET | Refills: 3 | Status: SHIPPED | OUTPATIENT
Start: 2025-01-10 | End: 2026-01-10

## 2025-01-10 RX ORDER — AMLODIPINE BESYLATE 10 MG/1
10 TABLET ORAL DAILY
Qty: 90 TABLET | Refills: 3 | Status: SHIPPED | OUTPATIENT
Start: 2025-01-10 | End: 2026-01-10

## 2025-01-10 NOTE — ASSESSMENT & PLAN NOTE
Noted on Imaging  Stable, asymptomatic   Start ASA   Continue Statin therapy  ED precautions given or chest pain, shortness to breath, dyspnea on exertion, etc.

## 2025-01-10 NOTE — ASSESSMENT & PLAN NOTE
Stable   Noted on CT scan 2024  Likely due from chronic inflammation from tobacco use/previous infections    Monitor closely, treat exacerbations   Consider daily inhaler and Pulm referral for further evaluation/management

## 2025-01-10 NOTE — PROGRESS NOTES
Annual Medicare Wellness Visit     Patient ID: [id number]     Chief Complaint: Medicare AWV    HPI:     Antonieta Shi is a 69 y.o. female here today for a Medicare Wellness. Overall doing well. Saw her Endocrinologist for her thyroid yesterday. Did labs for them, who will adjust her thyroid medication if needed. BP is much better controlled. Tolerating the medications well. Taking  10mg twice a day of Norvas and Metoprolol 50mg daily. Reviewed labs, answered all questions and concerns at this time. Endorsed right ear being stopped up.     Opioid Screening: Patient medication list reviewed, patient is not taking prescription opioids. Patient is not using additional opioids than prescribed. Patient is at low risk of substance abuse based on this opioid use history.     -------------------------------------    Hypertension    Hyponatremia    Thyroid disease      Past Surgical History:   Procedure Laterality Date    EYE SURGERY      GUM SURGERY      HEMORRHOID SURGERY      TONSILLECTOMY       Review of patient's allergies indicates:   Allergen Reactions    Hydrochlorothiazide Other (See Comments)     Hyponatremia     Outpatient Medications Marked as Taking for the 1/10/25 encounter (Office Visit) with Ambika Rees MD   Medication Sig Dispense Refill    metoprolol succinate (TOPROL-XL) 50 MG 24 hr tablet Take 1 tablet (50 mg total) by mouth once daily. 90 tablet 3    simvastatin (ZOCOR) 20 MG tablet Take 20 mg by mouth every evening.      SYNTHROID 88 mcg tablet Take 88 mcg by mouth before breakfast.      [DISCONTINUED] amLODIPine (NORVASC) 5 MG tablet Take 1 tablet (5 mg total) by mouth once daily. (Patient taking differently: Take 5 mg by mouth once daily. Patient reports taking 20mg total, daily.) 90 tablet 3     Social History     Socioeconomic History    Marital status:    Tobacco Use    Smoking status: Every Day     Current packs/day: 1.00     Average packs/day: 1 pack/day for 31.0 years (31.0  ttl pk-yrs)     Types: Cigarettes     Start date: 1994    Smokeless tobacco: Never   Substance and Sexual Activity    Alcohol use: Yes     Comment: occassionally    Drug use: Never    Sexual activity: Not Currently     Partners: Male     Social Drivers of Health     Financial Resource Strain: Low Risk  (11/18/2024)    Overall Financial Resource Strain (CARDIA)     Difficulty of Paying Living Expenses: Not hard at all   Food Insecurity: No Food Insecurity (11/18/2024)    Hunger Vital Sign     Worried About Running Out of Food in the Last Year: Never true     Ran Out of Food in the Last Year: Never true   Physical Activity: Insufficiently Active (11/18/2024)    Exercise Vital Sign     Days of Exercise per Week: 1 day     Minutes of Exercise per Session: 10 min   Stress: No Stress Concern Present (11/18/2024)    [employer] - Occupational Stress Questionnaire     Feeling of Stress : Only a little   Housing Stability: Unknown (11/18/2024)    Housing Stability Vital Sign     Unable to Pay for Housing in the Last Year: No     Family History   Problem Relation Name Age of Onset    Cancer Father Bradly       Patient Care Team:  Ambika Rees MD as PCP - General (Internal Medicine)     Subjective:     Review of Systems   Constitutional:  Negative for chills and fever.   HENT:  Positive for ear pain.    Respiratory:  Negative for cough.    Cardiovascular:  Negative for chest pain.   Gastrointestinal:  Negative for abdominal pain, diarrhea, nausea and vomiting.   Genitourinary:  Negative for dysuria and hematuria.   Psychiatric/Behavioral:  Negative for depression.      Patient Reported Health Risk Assessment  What [age] 70-78  Are you male or female?: Female  During the past four weeks, how much have you been bothered by emotional problems such as feeling anxious, depressed, irritable, sad, or downhearted and blue?: Not at all  During the past five weeks, has your physical and/or  emotional health limited your social activities with family, friends, neighbors, or groups?: Not at all  During the past four weeks, how much bodily pain have you generally had?: No pain  During the past four weeks, was someone available to help if you needed and wanted help?: Yes, as much as I wanted  During the past four weeks, what was the hardest physical activity you could do for at least two minutes?: Moderate  Can you get to places out of walking distance without help?  (For example, can you travel alone on buses or taxis, or drive your own car?): Yes  Can you go shopping for groceries or clothes without someone's help?: Yes  Can you prepare your own meals?: Yes  Can you do your own housework without help?: Yes  Because of any health problems, do you need the help of another person with your personal care needs such as eating, bathing, dressing, or getting around the house?: No  Can you handle your own money without help?: Yes  During the past four weeks, how would you rate your health in general?: Very good  How have things been going for you during the past four weeks?: Very well  Are you having difficulties driving your car?: No  Do you always fasten your seat belt when you are in a car?: Yes, usually  How often in the past four weeks have you been bothered by falling or dizzy when standing up?: Never  How often in the past four weeks have you been bothered by sexual problems?: Never  How often in the past four weeks have you been bothered by trouble eating well?: Never  How often in the past four weeks have you been bothered by teeth or denture problems?: Never  How often in the past four weeks have you been bothered with problems using the telephone?: Never  How often in the past four weeks have you been bothered by tiredness or fatigue?: Never  Have you fallen two or more times in the past year?: No  Are you afraid of falling?: No  Are you a smoker?: Yes, I'm not ready to quit  During the past four  "weeks, how many drinks of wine, beer, or other alcoholic beverages did you have?: 2-5 drinks per weeks  Do you exercise for about 20 minutes three or more days a week?: No, I usually do not exercise this much  Have you been given any information to help you with hazards in your house that might hurt you?: Yes  Have you been given any information to help you with keeping track of your medications?: Yes  How often do you have trouble taking medicines the way you've been told to take them?: I always take them as prescribed  How confident are you that you can control and manage most of your health problems?: Very confident  What is your race? (Check all that apply.):     Objective:     /80 (BP Location: Right arm, Patient Position: Sitting) Comment: home reading  Pulse (!) 55   Temp 98 °F (36.7 °C)   Resp 15   Ht 5' 5" (1.651 m)   Wt 69.5 kg (153 lb 3.2 oz)   SpO2 98%   BMI 25.49 kg/m²     Physical Exam  Vitals and nursing note reviewed.   Constitutional:       General: She is not in acute distress.     Appearance: Normal appearance. She is not ill-appearing.   HENT:      Head: Normocephalic.      Right Ear: External ear normal. There is impacted cerumen.      Left Ear: External ear normal. There is impacted cerumen.      Nose: Nose normal. No rhinorrhea.      Mouth/Throat:      Mouth: Mucous membranes are moist.   Eyes:      General: No scleral icterus.     Conjunctiva/sclera: Conjunctivae normal.   Cardiovascular:      Rate and Rhythm: Normal rate and regular rhythm.   Pulmonary:      Effort: Pulmonary effort is normal. No respiratory distress.   Musculoskeletal:         General: No deformity.   Skin:     General: Skin is warm and dry.      Coloration: Skin is not jaundiced.   Neurological:      Mental Status: She is alert and oriented to person, place, and time. Mental status is at baseline.      Gait: Gait normal.   Psychiatric:         Mood and Affect: Mood normal.         Behavior: Behavior " normal.           1/10/2025     8:00 AM 11/22/2024    10:40 AM   Fall Risk Assessment - Outpatient   Mobility Status Ambulatory Ambulatory   Number of falls 0 0   Identified as fall risk False False              Assessment/Plan:     1. Wellness examination  Assessment & Plan:  Routine Health Maintenance   Exercise as tolerated, >30 minutes a day for 3-5 days a week.  Counseled patient on compliance with medications and healthy diet.     Age-Appropriate Screening  Vaccinations:    - Flu: Discontinued, patient's preference   - Pneumonia: Discontinued, patient's preference   - Shingles (>50): Discontinued, patient's preference   - Tdap: Discontinued, patient's preference   - COVID: Discontinued, patient's preference    Screening:   - Pap Smear (21-65): Aged out   - Mammogram (40-50 discuss w/ pt, all >50): Ordered, patient will call next door to schedule   - Osteoporosis (all>65, sooner if risk factors): Ordered, patient will call next door to schedule   - Lung Ca. Screening (50-80 if >20 pack yrs current or quit <15 yrs): UTD, repeat next year in December   - Colon Ca. Screening (age >45): UTD, ordered cologuard, due in April   - Hep. C, HIV: Negative      2. Primary hypertension  -     valsartan (DIOVAN) 160 MG tablet; Take 1 tablet (160 mg total) by mouth once daily.  Dispense: 90 tablet; Refill: 3  -     Comprehensive Metabolic Panel; Future; Expected date: 02/10/2025  -     amLODIPine (NORVASC) 10 MG tablet; Take 1 tablet (10 mg total) by mouth once daily.  Dispense: 90 tablet; Refill: 3    3. Colon cancer screening  -     Cologuard Screening (Multitarget Stool DNA); Future; Expected date: 01/10/2025    4. Hyponatremia  Comments:  Mild at 134   Continue to monitor, was hospitalized for hyponatremia before   Encouraged electrolyte 1-2x a day   Salt intake 2500mg daily   Repeat CMP  Orders:  -     Comprehensive Metabolic Panel; Future; Expected date: 02/10/2025    5. Bilateral impacted cerumen  -     carbamide  peroxide (DEBROX) 6.5 % otic solution; Place 5 drops into both ears as needed (ear wax).  Dispense: 15 mL; Refill: 3  -     Ear wax removal  -     Ear Cerumen Removal    6. Pulmonary emphysema, unspecified emphysema type  Assessment & Plan:  Stable   Asymptomatic at this time  Current Smoker  If worsens consider daily chronic inhaler therapy and pulmonology referral         7. Calcified granuloma of lung  Assessment & Plan:  Stable   Noted on CT scan 2024  Likely due from chronic inflammation from tobacco use/previous infections    Monitor closely, treat exacerbations   Consider daily inhaler and Pulm referral for further evaluation/management         8. Atherosclerosis of abdominal aorta  Assessment & Plan:  Noted on Imaging  Stable, asymptomatic   Start ASA   Continue Statin therapy  ED precautions given or chest pain, shortness to breath, dyspnea on exertion, etc.        Medicare Annual Wellness and Personalized Prevention Plan:   Fall Risk + Home Safety + Hearing Impairment + Depression Screen + Opioid and Substance Abuse Screening + Cognitive Impairment Screen + Health Risk Assessment all reviewed.     Advance Care Planning   I attest to discussing Advance Care Planning with patient and/or family member.  Education was provided including the importance of the Health Care Power of , Advance Directives, and/or [place] documentation.  The patient expressed understanding to the importance of this information and discussion.     Advance Care Planning     Date: 01/10/2025  Patient did not wish or was not able to name a surrogate decision maker or provide an Advance Care Plan.       Follow up in about 4 weeks (around 2/7/2025) for HTN, Lab Results. In addition to their scheduled follow up, the patient has also been instructed to follow up on as needed basis.

## 2025-01-10 NOTE — PROCEDURES
Ear Cerumen Removal    Date/Time: 1/10/2025 8:00 AM    Performed by: Ambika Rees MD  Authorized by: Ambika Rees MD    Consent Done?:  Yes (Verbal)  Location details:  Both ears  Procedure type: irrigation    Cerumen  Removal Results:  Cerumen completely removed  Patient tolerance:  Patient tolerated the procedure well with no immediate complications

## 2025-01-10 NOTE — ASSESSMENT & PLAN NOTE
Routine Health Maintenance   Exercise as tolerated, >30 minutes a day for 3-5 days a week.  Counseled patient on compliance with medications and healthy diet.     Age-Appropriate Screening  Vaccinations:    - Flu: Discontinued, patient's preference   - Pneumonia: Discontinued, patient's preference   - Shingles (>50): Discontinued, patient's preference   - Tdap: Discontinued, patient's preference   - COVID: Discontinued, patient's preference    Screening:   - Pap Smear (21-65): Aged out   - Mammogram (40-50 discuss w/ pt, all >50): Ordered, patient will call next door to schedule   - Osteoporosis (all>65, sooner if risk factors): Ordered, patient will call next door to schedule   - Lung Ca. Screening (50-80 if >20 pack yrs current or quit <15 yrs): UTD, repeat next year in December   - Colon Ca. Screening (age >45): UTD, ordered cologuard, due in April   - Hep. C, HIV: Negative

## 2025-01-10 NOTE — ASSESSMENT & PLAN NOTE
Stable   Asymptomatic at this time  Current Smoker  If worsens consider daily chronic inhaler therapy and pulmonology referral

## 2025-02-06 ENCOUNTER — TELEPHONE (OUTPATIENT)
Dept: PRIMARY CARE CLINIC | Facility: CLINIC | Age: 70
End: 2025-02-06
Payer: MEDICARE

## 2025-02-06 NOTE — TELEPHONE ENCOUNTER
Called Pt to confirm appointment on 02/13/2025 and that non-fasting lab work is needed. Pt verbalized understanding.

## 2025-02-13 ENCOUNTER — OFFICE VISIT (OUTPATIENT)
Dept: PRIMARY CARE CLINIC | Facility: CLINIC | Age: 70
End: 2025-02-13
Payer: MEDICARE

## 2025-02-13 VITALS
BODY MASS INDEX: 25.29 KG/M2 | HEART RATE: 51 BPM | RESPIRATION RATE: 15 BRPM | WEIGHT: 151.81 LBS | DIASTOLIC BLOOD PRESSURE: 84 MMHG | SYSTOLIC BLOOD PRESSURE: 126 MMHG | OXYGEN SATURATION: 98 % | HEIGHT: 65 IN | TEMPERATURE: 98 F

## 2025-02-13 DIAGNOSIS — I10 PRIMARY HYPERTENSION: Primary | Chronic | ICD-10-CM

## 2025-02-13 DIAGNOSIS — E03.9 HYPOTHYROIDISM, UNSPECIFIED TYPE: Chronic | ICD-10-CM

## 2025-02-13 DIAGNOSIS — E87.1 HYPONATREMIA: ICD-10-CM

## 2025-02-13 PROCEDURE — 3074F SYST BP LT 130 MM HG: CPT | Mod: CPTII,,, | Performed by: STUDENT IN AN ORGANIZED HEALTH CARE EDUCATION/TRAINING PROGRAM

## 2025-02-13 PROCEDURE — 3288F FALL RISK ASSESSMENT DOCD: CPT | Mod: CPTII,,, | Performed by: STUDENT IN AN ORGANIZED HEALTH CARE EDUCATION/TRAINING PROGRAM

## 2025-02-13 PROCEDURE — 1159F MED LIST DOCD IN RCRD: CPT | Mod: CPTII,,, | Performed by: STUDENT IN AN ORGANIZED HEALTH CARE EDUCATION/TRAINING PROGRAM

## 2025-02-13 PROCEDURE — 4010F ACE/ARB THERAPY RXD/TAKEN: CPT | Mod: CPTII,,, | Performed by: STUDENT IN AN ORGANIZED HEALTH CARE EDUCATION/TRAINING PROGRAM

## 2025-02-13 PROCEDURE — 3008F BODY MASS INDEX DOCD: CPT | Mod: CPTII,,, | Performed by: STUDENT IN AN ORGANIZED HEALTH CARE EDUCATION/TRAINING PROGRAM

## 2025-02-13 PROCEDURE — 1160F RVW MEDS BY RX/DR IN RCRD: CPT | Mod: CPTII,,, | Performed by: STUDENT IN AN ORGANIZED HEALTH CARE EDUCATION/TRAINING PROGRAM

## 2025-02-13 PROCEDURE — 99214 OFFICE O/P EST MOD 30 MIN: CPT | Mod: ,,, | Performed by: STUDENT IN AN ORGANIZED HEALTH CARE EDUCATION/TRAINING PROGRAM

## 2025-02-13 PROCEDURE — 1101F PT FALLS ASSESS-DOCD LE1/YR: CPT | Mod: CPTII,,, | Performed by: STUDENT IN AN ORGANIZED HEALTH CARE EDUCATION/TRAINING PROGRAM

## 2025-02-13 PROCEDURE — 3044F HG A1C LEVEL LT 7.0%: CPT | Mod: CPTII,,, | Performed by: STUDENT IN AN ORGANIZED HEALTH CARE EDUCATION/TRAINING PROGRAM

## 2025-02-13 PROCEDURE — 3079F DIAST BP 80-89 MM HG: CPT | Mod: CPTII,,, | Performed by: STUDENT IN AN ORGANIZED HEALTH CARE EDUCATION/TRAINING PROGRAM

## 2025-02-13 RX ORDER — LEVOTHYROXINE SODIUM 75 UG/1
75 TABLET ORAL
COMMUNITY
Start: 2025-01-14

## 2025-02-13 RX ORDER — METOPROLOL SUCCINATE 50 MG/1
50 TABLET, EXTENDED RELEASE ORAL DAILY
Qty: 90 TABLET | Refills: 3 | Status: SHIPPED | OUTPATIENT
Start: 2025-02-13 | End: 2026-02-13

## 2025-02-14 NOTE — PROGRESS NOTES
Subjective:       Patient ID: Antonieta Shi is a 69 y.o. female.    -------------------------------------    Hypertension    Hyponatremia    Thyroid disease      Chief Complaint: Follow-up, Results, and Hypertension    History of Present Illness    CHIEF COMPLAINT:  Patient presents today for HTN follow up and lab results review.    MEDICAL HISTORY:  She has a history of low sodium levels requiring two hospitalizations. Reviewed labs, answered all questions and concerns at this time.    HYPERTENSION:  Blood pressure was well controlled at today's visit. Feeling really good.     MEDICATIONS:  She reports recent thyroid medication change from levothyroxine 88 mcg to 75 mcg.    ALLERGIES:  She has hydrochlorothiazide listed as an allergy due to severe hyponatremia leading to hospitalization.    PREVENTIVE CARE:  She is due for mammogram and bone scan. Colon cancer screening is due in April 2025.        Review of Systems   Constitutional:  Negative for chills and fever.   Respiratory:  Negative for cough.    Cardiovascular:  Negative for chest pain.   Gastrointestinal:  Negative for abdominal pain, diarrhea, nausea and vomiting.   Genitourinary:  Negative for dysuria and hematuria.   Psychiatric/Behavioral:  Negative for dysphoric mood.          Objective:      Physical Exam  Vitals and nursing note reviewed.   Constitutional:       General: She is not in acute distress.     Appearance: Normal appearance. She is not ill-appearing.   HENT:      Head: Normocephalic.      Nose: Nose normal. No rhinorrhea.      Mouth/Throat:      Mouth: Mucous membranes are moist.   Eyes:      General: No scleral icterus.     Conjunctiva/sclera: Conjunctivae normal.   Cardiovascular:      Rate and Rhythm: Normal rate and regular rhythm.   Pulmonary:      Effort: Pulmonary effort is normal. No respiratory distress.   Musculoskeletal:         General: No deformity.   Skin:     General: Skin is warm and dry.      Coloration: Skin is not  jaundiced.   Neurological:      Mental Status: She is alert and oriented to person, place, and time. Mental status is at baseline.      Gait: Gait normal.   Psychiatric:         Mood and Affect: Mood normal.         Behavior: Behavior normal.         Assessment & Plan:   Assessment & Plan    I10 Essential (primary) hypertension  E03.9 Hypothyroidism, unspecified  E87.1 Hyponatremia    IMPRESSION:  Assessed blood pressure, which was found to be well-controlled  Reviewed comprehensive lab results, noting:  - Normal kidney and liver function  - Cholesterol within target range  - A1c of 5.2, ruling out diabetes  Noted slightly low sodium (134), improved from previous values but still requiring monitoring  Observed thyroid level at lower end of normal range, coinciding with recent medication adjustment by endocrinologist    HYPERTENSION:  - Continued metoprolol prescription and sent in a 90-day supply.  - Evaluated that the patient's blood pressure is well-controlled and they are feeling good on current medication.    THYROID DISORDER:  - Noted recent decrease in thyroid medication from 88 mcg to 75 mcg, as adjusted by endocrinologist.  - Expressed willingness to adjust medication if needed in the future.    HYPONATREMIA:  - Added hydrochlorothiazide to the patient's allergy list due to past issues with low sodium.  - Checked the patient's sodium level and found it to be slightly low at 134, but improved from previous readings.  - Emphasized the importance of maintaining adequate sodium intake, especially given patient's history of hyponatremia.  - Discussed the role of electrolytes in maintaining proper hydration and sodium balance.  - Recommend increasing daily salt intake to 2,000-2,500 milligrams.    BREAST CANCER SCREENING:  - Mammogram ordered.    OSTEOPOROSIS SCREENING:  - Bone scan ordered.    COLON CANCER SCREENING:  - [facility] to be ordered in April 2025 for colon cancer screening.      Follow up in about 5  months (around 7/13/2025) for HTN, Lab Results. In addition to their scheduled follow up, the patient has also been instructed to follow up on as needed basis.    This note was generated with the assistance of ambient listening technology. Verbal consent was obtained by the patient and accompanying visitor(s) for the recording of patient appointment to facilitate this note. I attest to having reviewed and edited the generated note for accuracy, though some syntax or spelling errors may persist. Please contact the author of this note for any clarification.

## 2025-03-24 DIAGNOSIS — E03.9 HYPOTHYROIDISM, UNSPECIFIED TYPE: Primary | Chronic | ICD-10-CM

## 2025-03-25 RX ORDER — LEVOTHYROXINE SODIUM 75 UG/1
75 TABLET ORAL
Qty: 90 TABLET | Refills: 1 | Status: SHIPPED | OUTPATIENT
Start: 2025-03-25

## 2025-04-14 ENCOUNTER — TELEPHONE (OUTPATIENT)
Dept: RADIOLOGY | Facility: HOSPITAL | Age: 70
End: 2025-04-14
Payer: MEDICARE

## 2025-04-14 DIAGNOSIS — R92.8 ABNORMAL FINDING ON MAMMOGRAPHY: Primary | ICD-10-CM

## 2025-04-23 ENCOUNTER — HOSPITAL ENCOUNTER (OUTPATIENT)
Dept: RADIOLOGY | Facility: HOSPITAL | Age: 70
Discharge: HOME | End: 2025-04-23
Attending: STUDENT IN AN ORGANIZED HEALTH CARE EDUCATION/TRAINING PROGRAM
Payer: MEDICARE

## 2025-04-23 DIAGNOSIS — R92.8 ABNORMAL MAMMOGRAM: ICD-10-CM

## 2025-04-23 PROCEDURE — 77065 DX MAMMO INCL CAD UNI: CPT | Mod: 26,RT,, | Performed by: RADIOLOGY

## 2025-04-23 PROCEDURE — 76642 ULTRASOUND BREAST LIMITED: CPT | Mod: TC,RT

## 2025-04-23 PROCEDURE — 77061 BREAST TOMOSYNTHESIS UNI: CPT | Mod: 26,RT,, | Performed by: RADIOLOGY

## 2025-04-23 PROCEDURE — 77065 DX MAMMO INCL CAD UNI: CPT | Mod: TC,RT

## 2025-04-23 PROCEDURE — 76642 ULTRASOUND BREAST LIMITED: CPT | Mod: 26,RT,, | Performed by: RADIOLOGY

## 2025-05-12 ENCOUNTER — HOSPITAL ENCOUNTER (OUTPATIENT)
Dept: RADIOLOGY | Facility: HOSPITAL | Age: 70
Discharge: HOME | End: 2025-05-12
Attending: STUDENT IN AN ORGANIZED HEALTH CARE EDUCATION/TRAINING PROGRAM
Payer: MEDICARE

## 2025-05-12 DIAGNOSIS — R92.8 ABNORMAL MAMMOGRAM: ICD-10-CM

## 2025-05-12 PROCEDURE — 27000550 US BIOPSY LYMPH NODE AXILLA

## 2025-05-12 PROCEDURE — 88305 TISSUE EXAM BY PATHOLOGIST: CPT | Performed by: STUDENT IN AN ORGANIZED HEALTH CARE EDUCATION/TRAINING PROGRAM

## 2025-05-12 PROCEDURE — 27000550 US BREAST BIOPSY WITH IMAGING 1ST SITE RIGHT

## 2025-05-12 PROCEDURE — 19084 BX BREAST ADD LESION US IMAG: CPT

## 2025-05-12 PROCEDURE — 77065 DX MAMMO INCL CAD UNI: CPT | Mod: TC,RT

## 2025-05-13 ENCOUNTER — RESULTS FOLLOW-UP (OUTPATIENT)
Dept: RADIOLOGY | Facility: HOSPITAL | Age: 70
End: 2025-05-13

## 2025-05-13 LAB — PSYCHE PATHOLOGY RESULT: NORMAL

## 2025-05-13 NOTE — PROGRESS NOTES
Results were reviewed by Dr. Odonnell--benign and concordant; recommend patient return to screening mammogram due April 2026. Results and recommendation were called to the patient. Patient verbalized understanding.

## 2025-05-23 ENCOUNTER — RESULTS FOLLOW-UP (OUTPATIENT)
Dept: PRIMARY CARE CLINIC | Facility: CLINIC | Age: 70
End: 2025-05-23

## 2025-06-25 ENCOUNTER — TELEPHONE (OUTPATIENT)
Dept: PRIMARY CARE CLINIC | Facility: CLINIC | Age: 70
End: 2025-06-25
Payer: MEDICARE

## 2025-06-25 NOTE — TELEPHONE ENCOUNTER
Spoke with patient and advised her we would take over for Dr. Gomez. Patient verbalized understanding.

## 2025-06-25 NOTE — TELEPHONE ENCOUNTER
Copied from CRM #[id number]. Topic: General Inquiry - Patient Advice  >> Jun 25, 2025  9:15 AM Lisa wrote:  .Type:  Patient Returning Call    Who Called:pt  Who Left Message for Patient:pt  Does the patient know what this is regarding?:Dr RISK is retiring   Would the patient rather a call back or a response via [facility]? terry  Best Call Back Number:[phone]  Additional Information: Please call back want to know if  can take over what she does with Dr Risk because he is retiring. Need to know if she can give authorization to release her records to

## 2025-07-14 ENCOUNTER — LAB VISIT (OUTPATIENT)
Dept: LAB | Facility: HOSPITAL | Age: 70
End: 2025-07-14
Attending: STUDENT IN AN ORGANIZED HEALTH CARE EDUCATION/TRAINING PROGRAM
Payer: MEDICARE

## 2025-07-14 DIAGNOSIS — E87.1 HYPONATREMIA: ICD-10-CM

## 2025-07-14 DIAGNOSIS — E03.9 HYPOTHYROIDISM, UNSPECIFIED TYPE: Chronic | ICD-10-CM

## 2025-07-14 LAB
ALBUMIN SERPL-MCNC: 3.8 G/DL (ref 3.4–4.8)
ALBUMIN/GLOB SERPL: 1.3 RATIO (ref 1.1–2)
ALP SERPL-CCNC: 89 UNIT/L (ref 40–150)
ALT SERPL-CCNC: 12 UNIT/L (ref 0–55)
ANION GAP SERPL CALC-SCNC: 7 MEQ/L
AST SERPL-CCNC: 17 UNIT/L (ref 11–45)
BILIRUB SERPL-MCNC: 1.1 MG/DL
BUN SERPL-MCNC: 17.8 MG/DL (ref 9.8–20.1)
CALCIUM SERPL-MCNC: 9.1 MG/DL (ref 8.4–10.2)
CHLORIDE SERPL-SCNC: 105 MMOL/L (ref 98–107)
CO2 SERPL-SCNC: 25 MMOL/L (ref 23–31)
CREAT SERPL-MCNC: 1 MG/DL (ref 0.55–1.02)
CREAT/UREA NIT SERPL: 18
GFR SERPLBLD CREATININE-BSD FMLA CKD-EPI: 60 ML/MIN/1.73/M2
GLOBULIN SER-MCNC: 2.9 GM/DL (ref 2.4–3.5)
GLUCOSE SERPL-MCNC: 93 MG/DL (ref 82–115)
POTASSIUM SERPL-SCNC: 5.1 MMOL/L (ref 3.5–5.1)
PROT SERPL-MCNC: 6.7 GM/DL (ref 5.8–7.6)
SODIUM SERPL-SCNC: 137 MMOL/L (ref 136–145)
TSH SERPL-ACNC: 0.33 UIU/ML (ref 0.35–4.94)

## 2025-07-14 PROCEDURE — 80053 COMPREHEN METABOLIC PANEL: CPT

## 2025-07-14 PROCEDURE — 36415 COLL VENOUS BLD VENIPUNCTURE: CPT

## 2025-07-14 PROCEDURE — 84443 ASSAY THYROID STIM HORMONE: CPT

## 2025-07-17 ENCOUNTER — OFFICE VISIT (OUTPATIENT)
Dept: PRIMARY CARE CLINIC | Facility: CLINIC | Age: 70
End: 2025-07-17
Payer: MEDICARE

## 2025-07-17 VITALS
BODY MASS INDEX: 25.49 KG/M2 | WEIGHT: 153 LBS | HEART RATE: 50 BPM | DIASTOLIC BLOOD PRESSURE: 84 MMHG | SYSTOLIC BLOOD PRESSURE: 134 MMHG | HEIGHT: 65 IN | RESPIRATION RATE: 16 BRPM

## 2025-07-17 DIAGNOSIS — I10 PRIMARY HYPERTENSION: Chronic | ICD-10-CM

## 2025-07-17 DIAGNOSIS — E03.9 HYPOTHYROIDISM, UNSPECIFIED TYPE: Primary | Chronic | ICD-10-CM

## 2025-07-17 DIAGNOSIS — Z78.0 POST-MENOPAUSAL: ICD-10-CM

## 2025-07-17 PROCEDURE — 3288F FALL RISK ASSESSMENT DOCD: CPT | Mod: CPTII,,, | Performed by: STUDENT IN AN ORGANIZED HEALTH CARE EDUCATION/TRAINING PROGRAM

## 2025-07-17 PROCEDURE — 1159F MED LIST DOCD IN RCRD: CPT | Mod: CPTII,,, | Performed by: STUDENT IN AN ORGANIZED HEALTH CARE EDUCATION/TRAINING PROGRAM

## 2025-07-17 PROCEDURE — 3008F BODY MASS INDEX DOCD: CPT | Mod: CPTII,,, | Performed by: STUDENT IN AN ORGANIZED HEALTH CARE EDUCATION/TRAINING PROGRAM

## 2025-07-17 PROCEDURE — 3079F DIAST BP 80-89 MM HG: CPT | Mod: CPTII,,, | Performed by: STUDENT IN AN ORGANIZED HEALTH CARE EDUCATION/TRAINING PROGRAM

## 2025-07-17 PROCEDURE — 99214 OFFICE O/P EST MOD 30 MIN: CPT | Mod: ,,, | Performed by: STUDENT IN AN ORGANIZED HEALTH CARE EDUCATION/TRAINING PROGRAM

## 2025-07-17 PROCEDURE — 3075F SYST BP GE 130 - 139MM HG: CPT | Mod: CPTII,,, | Performed by: STUDENT IN AN ORGANIZED HEALTH CARE EDUCATION/TRAINING PROGRAM

## 2025-07-17 PROCEDURE — 3044F HG A1C LEVEL LT 7.0%: CPT | Mod: CPTII,,, | Performed by: STUDENT IN AN ORGANIZED HEALTH CARE EDUCATION/TRAINING PROGRAM

## 2025-07-17 PROCEDURE — 1101F PT FALLS ASSESS-DOCD LE1/YR: CPT | Mod: CPTII,,, | Performed by: STUDENT IN AN ORGANIZED HEALTH CARE EDUCATION/TRAINING PROGRAM

## 2025-07-17 PROCEDURE — 1126F AMNT PAIN NOTED NONE PRSNT: CPT | Mod: CPTII,,, | Performed by: STUDENT IN AN ORGANIZED HEALTH CARE EDUCATION/TRAINING PROGRAM

## 2025-07-17 PROCEDURE — 1160F RVW MEDS BY RX/DR IN RCRD: CPT | Mod: CPTII,,, | Performed by: STUDENT IN AN ORGANIZED HEALTH CARE EDUCATION/TRAINING PROGRAM

## 2025-07-17 PROCEDURE — 4010F ACE/ARB THERAPY RXD/TAKEN: CPT | Mod: CPTII,,, | Performed by: STUDENT IN AN ORGANIZED HEALTH CARE EDUCATION/TRAINING PROGRAM

## 2025-07-17 RX ORDER — LEVOTHYROXINE SODIUM 50 UG/1
TABLET ORAL
Qty: 30 TABLET | Refills: 11 | Status: SHIPPED | OUTPATIENT
Start: 2025-07-17

## 2025-07-17 NOTE — PROGRESS NOTES
Subjective:       Patient ID: Antonieta Shi is a 70 y.o. female.    -------------------------------------    Hypertension    Hyponatremia    Thyroid disease      Chief Complaint: Hypertension (Patient is here for a hypertension follow up, labs done prior to appointment.)    History of Present Illness    CHIEF COMPLAINT:  Patient presents today for blood pressure follow-up and lab results review.    THYROID DISORDER:  She has a history of Graves' disease previously treated with radiation therapy. Her thyroid medication requirements have recently fluctuated after a period of stability. Her thyroid levels are on the lower end, leading to recent adjustments to 75 mcg. Repeat levels show that the TSH is still too low. She denies any symptoms related to these medication changes and is compliant with management.     HEALTH MAINTENANCE:  - Due for DEXA scan as well.        Review of Systems   Constitutional:  Negative for chills and fever.   Respiratory:  Negative for cough.    Cardiovascular:  Negative for chest pain.   Gastrointestinal:  Negative for abdominal pain and vomiting.   Genitourinary:  Negative for dysuria and hematuria.   Psychiatric/Behavioral:  Negative for dysphoric mood.          Objective:      Physical Exam  Vitals and nursing note reviewed.   Constitutional:       General: She is not in acute distress.     Appearance: Normal appearance. She is not ill-appearing.   HENT:      Head: Normocephalic.      Nose: Nose normal. No rhinorrhea.      Mouth/Throat:      Mouth: Mucous membranes are moist.   Eyes:      General: No scleral icterus.     Conjunctiva/sclera: Conjunctivae normal.   Cardiovascular:      Rate and Rhythm: Normal rate and regular rhythm.   Pulmonary:      Effort: Pulmonary effort is normal. No respiratory distress.   Musculoskeletal:         General: No deformity.   Skin:     General: Skin is warm and dry.      Coloration: Skin is not jaundiced.   Neurological:      Mental Status: She is  alert and oriented to person, place, and time. Mental status is at baseline.      Gait: Gait normal.   Psychiatric:         Mood and Affect: Mood normal.         Behavior: Behavior normal.         Assessment & Plan:   Assessment & Plan    I10 Primary Hypertension   E03.9 Hypothyroidism, unspecified type   Z78.0 Post - menopausal     IMPRESSION:  Reviewed BP and lab results, noting BP remains well-controlled.  Assessed thyroid function, noting TSH slightly low at 0.35 (improved from previous 0.1).  Determined need for minor adjustment to levothyroxine dosage to optimize TSH levels between 0.5-2.0, balancing thyroid function with long-term cardiac and bone health considerations.  Decided on a split dosing regimen to fine-tune thyroid medication.    HYPOTHYROIDISM:  - Monitored the patient's thyroid function following radiation treatment for Graves' disease.  - TSH has improved from 0.1 to 0.35, which is on the lower end of normal but still requires adjustment to reach optimal range.  - Educated the patient about the importance of maintaining thyroid levels within normal range to prevent strain on heart and bone health.  - Explained thyroid hormone feedback loop, interpretation of TSH levels, and proper levothyroxine administration (take on empty stomach with water only).  - Modified levothyroxine regimen: continue 75 mcg Monday through Friday, with reduced dose of 50 mcg on Saturday and Sunday.  - Ordered thyroid function tests to be repeated in 6 weeks.    HYPERTENSION:   - BP was well controlled   - Continue metoprolol, norvasc, and valsartan     POST MENOPAUSAL:  - Ordered DEXA scan     FOLLOW-UP:  - Scheduled follow up in 6 weeks with lab results with either the physician or BANDAR Cooper.       Follow up in about 6 weeks (around 8/28/2025) for Thyroid, Lab Results. In addition to their scheduled follow up, the patient has also been instructed to follow up on as needed basis.    This note was generated with the  assistance of ambient listening technology. Verbal consent was obtained by the patient and accompanying visitor(s) for the recording of patient appointment to facilitate this note. I attest to having reviewed and edited the generated note for accuracy, though some syntax or spelling errors may persist. Please contact the author of this note for any clarification.

## 2025-08-05 DIAGNOSIS — I10 PRIMARY HYPERTENSION: Chronic | ICD-10-CM

## 2025-08-05 RX ORDER — METOPROLOL SUCCINATE 50 MG/1
50 TABLET, EXTENDED RELEASE ORAL DAILY
Qty: 90 TABLET | Refills: 3 | Status: SHIPPED | OUTPATIENT
Start: 2025-08-05 | End: 2026-08-05

## 2025-08-25 ENCOUNTER — LAB VISIT (OUTPATIENT)
Dept: LAB | Facility: HOSPITAL | Age: 70
End: 2025-08-25
Attending: STUDENT IN AN ORGANIZED HEALTH CARE EDUCATION/TRAINING PROGRAM
Payer: MEDICARE

## 2025-08-25 DIAGNOSIS — E03.9 HYPOTHYROIDISM, UNSPECIFIED TYPE: Chronic | ICD-10-CM

## 2025-08-25 LAB
T3 SERPL-MCNC: 55.12 NG/DL (ref 60–180)
T4 FREE SERPL-MCNC: 1.39 NG/DL (ref 0.7–1.48)
TSH SERPL-ACNC: 0.85 UIU/ML (ref 0.35–4.94)

## 2025-08-25 PROCEDURE — 36415 COLL VENOUS BLD VENIPUNCTURE: CPT

## 2025-08-25 PROCEDURE — 84443 ASSAY THYROID STIM HORMONE: CPT

## 2025-08-25 PROCEDURE — 84439 ASSAY OF FREE THYROXINE: CPT

## 2025-08-25 PROCEDURE — 84480 ASSAY TRIIODOTHYRONINE (T3): CPT

## 2025-08-29 ENCOUNTER — OFFICE VISIT (OUTPATIENT)
Dept: PRIMARY CARE CLINIC | Facility: CLINIC | Age: 70
End: 2025-08-29
Payer: MEDICARE

## 2025-08-29 VITALS
SYSTOLIC BLOOD PRESSURE: 120 MMHG | HEIGHT: 65 IN | TEMPERATURE: 98 F | DIASTOLIC BLOOD PRESSURE: 63 MMHG | RESPIRATION RATE: 16 BRPM | OXYGEN SATURATION: 100 % | WEIGHT: 147 LBS | HEART RATE: 52 BPM | BODY MASS INDEX: 24.49 KG/M2

## 2025-08-29 DIAGNOSIS — E03.9 HYPOTHYROIDISM, UNSPECIFIED TYPE: Chronic | ICD-10-CM

## 2025-08-29 DIAGNOSIS — J43.9 PULMONARY EMPHYSEMA, UNSPECIFIED EMPHYSEMA TYPE: Chronic | ICD-10-CM

## 2025-08-29 DIAGNOSIS — R73.09 ELEVATED GLUCOSE LEVEL: ICD-10-CM

## 2025-08-29 DIAGNOSIS — I10 PRIMARY HYPERTENSION: Chronic | ICD-10-CM

## 2025-08-29 DIAGNOSIS — F17.210 CIGARETTE NICOTINE DEPENDENCE WITHOUT COMPLICATION: Primary | Chronic | ICD-10-CM

## 2025-08-29 DIAGNOSIS — E55.9 VITAMIN D DEFICIENCY: ICD-10-CM
